# Patient Record
Sex: MALE | Race: OTHER | NOT HISPANIC OR LATINO
[De-identification: names, ages, dates, MRNs, and addresses within clinical notes are randomized per-mention and may not be internally consistent; named-entity substitution may affect disease eponyms.]

---

## 2022-09-09 ENCOUNTER — NON-APPOINTMENT (OUTPATIENT)
Age: 47
End: 2022-09-09

## 2023-11-09 ENCOUNTER — INPATIENT (INPATIENT)
Facility: HOSPITAL | Age: 48
LOS: 2 days | Discharge: HOME | DRG: 842 | End: 2023-11-12
Attending: STUDENT IN AN ORGANIZED HEALTH CARE EDUCATION/TRAINING PROGRAM | Admitting: STUDENT IN AN ORGANIZED HEALTH CARE EDUCATION/TRAINING PROGRAM
Payer: MEDICAID

## 2023-11-09 VITALS
HEART RATE: 75 BPM | WEIGHT: 190.04 LBS | OXYGEN SATURATION: 99 % | SYSTOLIC BLOOD PRESSURE: 192 MMHG | DIASTOLIC BLOOD PRESSURE: 113 MMHG | HEIGHT: 75 IN | RESPIRATION RATE: 17 BRPM | TEMPERATURE: 98 F

## 2023-11-09 DIAGNOSIS — D75.1 SECONDARY POLYCYTHEMIA: ICD-10-CM

## 2023-11-09 LAB
ALBUMIN SERPL ELPH-MCNC: 4 G/DL — SIGNIFICANT CHANGE UP (ref 3.5–5)
ALBUMIN SERPL ELPH-MCNC: 4 G/DL — SIGNIFICANT CHANGE UP (ref 3.5–5)
ALBUMIN SERPL ELPH-MCNC: 4.1 G/DL — SIGNIFICANT CHANGE UP (ref 3.5–5)
ALBUMIN SERPL ELPH-MCNC: 4.1 G/DL — SIGNIFICANT CHANGE UP (ref 3.5–5)
ALP SERPL-CCNC: 87 U/L — SIGNIFICANT CHANGE UP (ref 40–120)
ALP SERPL-CCNC: 87 U/L — SIGNIFICANT CHANGE UP (ref 40–120)
ALP SERPL-CCNC: 90 U/L — SIGNIFICANT CHANGE UP (ref 40–120)
ALP SERPL-CCNC: 90 U/L — SIGNIFICANT CHANGE UP (ref 40–120)
ALT FLD-CCNC: 73 U/L DA — HIGH (ref 10–60)
ALT FLD-CCNC: 73 U/L DA — HIGH (ref 10–60)
ALT FLD-CCNC: 77 U/L DA — HIGH (ref 10–60)
ALT FLD-CCNC: 77 U/L DA — HIGH (ref 10–60)
ANION GAP SERPL CALC-SCNC: 2 MMOL/L — LOW (ref 5–17)
ANION GAP SERPL CALC-SCNC: 2 MMOL/L — LOW (ref 5–17)
ANION GAP SERPL CALC-SCNC: 3 MMOL/L — LOW (ref 5–17)
ANION GAP SERPL CALC-SCNC: 3 MMOL/L — LOW (ref 5–17)
APPEARANCE UR: CLEAR — SIGNIFICANT CHANGE UP
APPEARANCE UR: CLEAR — SIGNIFICANT CHANGE UP
AST SERPL-CCNC: 40 U/L — SIGNIFICANT CHANGE UP (ref 10–40)
AST SERPL-CCNC: 40 U/L — SIGNIFICANT CHANGE UP (ref 10–40)
AST SERPL-CCNC: 43 U/L — HIGH (ref 10–40)
AST SERPL-CCNC: 43 U/L — HIGH (ref 10–40)
BACTERIA # UR AUTO: ABNORMAL /HPF
BACTERIA # UR AUTO: ABNORMAL /HPF
BASOPHILS # BLD AUTO: 0.01 K/UL — SIGNIFICANT CHANGE UP (ref 0–0.2)
BASOPHILS # BLD AUTO: 0.01 K/UL — SIGNIFICANT CHANGE UP (ref 0–0.2)
BASOPHILS NFR BLD AUTO: 0.2 % — SIGNIFICANT CHANGE UP (ref 0–2)
BASOPHILS NFR BLD AUTO: 0.2 % — SIGNIFICANT CHANGE UP (ref 0–2)
BILIRUB SERPL-MCNC: 0.9 MG/DL — SIGNIFICANT CHANGE UP (ref 0.2–1.2)
BILIRUB SERPL-MCNC: 0.9 MG/DL — SIGNIFICANT CHANGE UP (ref 0.2–1.2)
BILIRUB SERPL-MCNC: 1.1 MG/DL — SIGNIFICANT CHANGE UP (ref 0.2–1.2)
BILIRUB SERPL-MCNC: 1.1 MG/DL — SIGNIFICANT CHANGE UP (ref 0.2–1.2)
BILIRUB UR-MCNC: NEGATIVE — SIGNIFICANT CHANGE UP
BILIRUB UR-MCNC: NEGATIVE — SIGNIFICANT CHANGE UP
BUN SERPL-MCNC: 10 MG/DL — SIGNIFICANT CHANGE UP (ref 7–18)
BUN SERPL-MCNC: 10 MG/DL — SIGNIFICANT CHANGE UP (ref 7–18)
BUN SERPL-MCNC: 9 MG/DL — SIGNIFICANT CHANGE UP (ref 7–18)
BUN SERPL-MCNC: 9 MG/DL — SIGNIFICANT CHANGE UP (ref 7–18)
CALCIUM SERPL-MCNC: 8.4 MG/DL — SIGNIFICANT CHANGE UP (ref 8.4–10.5)
CALCIUM SERPL-MCNC: 8.4 MG/DL — SIGNIFICANT CHANGE UP (ref 8.4–10.5)
CALCIUM SERPL-MCNC: 9.1 MG/DL — SIGNIFICANT CHANGE UP (ref 8.4–10.5)
CALCIUM SERPL-MCNC: 9.1 MG/DL — SIGNIFICANT CHANGE UP (ref 8.4–10.5)
CHLORIDE SERPL-SCNC: 106 MMOL/L — SIGNIFICANT CHANGE UP (ref 96–108)
CHLORIDE SERPL-SCNC: 106 MMOL/L — SIGNIFICANT CHANGE UP (ref 96–108)
CHLORIDE SERPL-SCNC: 107 MMOL/L — SIGNIFICANT CHANGE UP (ref 96–108)
CHLORIDE SERPL-SCNC: 107 MMOL/L — SIGNIFICANT CHANGE UP (ref 96–108)
CO2 SERPL-SCNC: 28 MMOL/L — SIGNIFICANT CHANGE UP (ref 22–31)
COLOR SPEC: YELLOW — SIGNIFICANT CHANGE UP
COLOR SPEC: YELLOW — SIGNIFICANT CHANGE UP
CREAT SERPL-MCNC: 0.89 MG/DL — SIGNIFICANT CHANGE UP (ref 0.5–1.3)
CREAT SERPL-MCNC: 0.89 MG/DL — SIGNIFICANT CHANGE UP (ref 0.5–1.3)
CREAT SERPL-MCNC: 0.92 MG/DL — SIGNIFICANT CHANGE UP (ref 0.5–1.3)
CREAT SERPL-MCNC: 0.92 MG/DL — SIGNIFICANT CHANGE UP (ref 0.5–1.3)
DIFF PNL FLD: ABNORMAL
DIFF PNL FLD: ABNORMAL
EGFR: 101 ML/MIN/1.73M2 — SIGNIFICANT CHANGE UP
EGFR: 101 ML/MIN/1.73M2 — SIGNIFICANT CHANGE UP
EGFR: 104 ML/MIN/1.73M2 — SIGNIFICANT CHANGE UP
EGFR: 104 ML/MIN/1.73M2 — SIGNIFICANT CHANGE UP
EOSINOPHIL # BLD AUTO: 0 K/UL — SIGNIFICANT CHANGE UP (ref 0–0.5)
EOSINOPHIL # BLD AUTO: 0 K/UL — SIGNIFICANT CHANGE UP (ref 0–0.5)
EOSINOPHIL NFR BLD AUTO: 0 % — SIGNIFICANT CHANGE UP (ref 0–6)
EOSINOPHIL NFR BLD AUTO: 0 % — SIGNIFICANT CHANGE UP (ref 0–6)
EPI CELLS # UR: PRESENT
EPI CELLS # UR: PRESENT
GLUCOSE SERPL-MCNC: 128 MG/DL — HIGH (ref 70–99)
GLUCOSE SERPL-MCNC: 128 MG/DL — HIGH (ref 70–99)
GLUCOSE SERPL-MCNC: 143 MG/DL — HIGH (ref 70–99)
GLUCOSE SERPL-MCNC: 143 MG/DL — HIGH (ref 70–99)
GLUCOSE UR QL: NEGATIVE MG/DL — SIGNIFICANT CHANGE UP
GLUCOSE UR QL: NEGATIVE MG/DL — SIGNIFICANT CHANGE UP
HCT VFR BLD CALC: 56.2 % — HIGH (ref 39–50)
HCT VFR BLD CALC: 56.2 % — HIGH (ref 39–50)
HCT VFR BLD CALC: 56.4 % — HIGH (ref 39–50)
HCT VFR BLD CALC: 56.4 % — HIGH (ref 39–50)
HGB BLD-MCNC: 19.3 G/DL — CRITICAL HIGH (ref 13–17)
HGB BLD-MCNC: 19.3 G/DL — CRITICAL HIGH (ref 13–17)
HGB BLD-MCNC: 19.6 G/DL — CRITICAL HIGH (ref 13–17)
HGB BLD-MCNC: 19.6 G/DL — CRITICAL HIGH (ref 13–17)
IMM GRANULOCYTES NFR BLD AUTO: 0.5 % — SIGNIFICANT CHANGE UP (ref 0–0.9)
IMM GRANULOCYTES NFR BLD AUTO: 0.5 % — SIGNIFICANT CHANGE UP (ref 0–0.9)
KETONES UR-MCNC: NEGATIVE MG/DL — SIGNIFICANT CHANGE UP
KETONES UR-MCNC: NEGATIVE MG/DL — SIGNIFICANT CHANGE UP
LEUKOCYTE ESTERASE UR-ACNC: NEGATIVE — SIGNIFICANT CHANGE UP
LEUKOCYTE ESTERASE UR-ACNC: NEGATIVE — SIGNIFICANT CHANGE UP
LYMPHOCYTES # BLD AUTO: 1.32 K/UL — SIGNIFICANT CHANGE UP (ref 1–3.3)
LYMPHOCYTES # BLD AUTO: 1.32 K/UL — SIGNIFICANT CHANGE UP (ref 1–3.3)
LYMPHOCYTES # BLD AUTO: 30.8 % — SIGNIFICANT CHANGE UP (ref 13–44)
LYMPHOCYTES # BLD AUTO: 30.8 % — SIGNIFICANT CHANGE UP (ref 13–44)
MCHC RBC-ENTMCNC: 33.6 PG — SIGNIFICANT CHANGE UP (ref 27–34)
MCHC RBC-ENTMCNC: 33.6 PG — SIGNIFICANT CHANGE UP (ref 27–34)
MCHC RBC-ENTMCNC: 33.9 PG — SIGNIFICANT CHANGE UP (ref 27–34)
MCHC RBC-ENTMCNC: 33.9 PG — SIGNIFICANT CHANGE UP (ref 27–34)
MCHC RBC-ENTMCNC: 34.2 GM/DL — SIGNIFICANT CHANGE UP (ref 32–36)
MCHC RBC-ENTMCNC: 34.2 GM/DL — SIGNIFICANT CHANGE UP (ref 32–36)
MCHC RBC-ENTMCNC: 34.9 GM/DL — SIGNIFICANT CHANGE UP (ref 32–36)
MCHC RBC-ENTMCNC: 34.9 GM/DL — SIGNIFICANT CHANGE UP (ref 32–36)
MCV RBC AUTO: 97.2 FL — SIGNIFICANT CHANGE UP (ref 80–100)
MCV RBC AUTO: 97.2 FL — SIGNIFICANT CHANGE UP (ref 80–100)
MCV RBC AUTO: 98.3 FL — SIGNIFICANT CHANGE UP (ref 80–100)
MCV RBC AUTO: 98.3 FL — SIGNIFICANT CHANGE UP (ref 80–100)
MONOCYTES # BLD AUTO: 0.43 K/UL — SIGNIFICANT CHANGE UP (ref 0–0.9)
MONOCYTES # BLD AUTO: 0.43 K/UL — SIGNIFICANT CHANGE UP (ref 0–0.9)
MONOCYTES NFR BLD AUTO: 10 % — SIGNIFICANT CHANGE UP (ref 2–14)
MONOCYTES NFR BLD AUTO: 10 % — SIGNIFICANT CHANGE UP (ref 2–14)
NEUTROPHILS # BLD AUTO: 2.5 K/UL — SIGNIFICANT CHANGE UP (ref 1.8–7.4)
NEUTROPHILS # BLD AUTO: 2.5 K/UL — SIGNIFICANT CHANGE UP (ref 1.8–7.4)
NEUTROPHILS NFR BLD AUTO: 58.5 % — SIGNIFICANT CHANGE UP (ref 43–77)
NEUTROPHILS NFR BLD AUTO: 58.5 % — SIGNIFICANT CHANGE UP (ref 43–77)
NITRITE UR-MCNC: NEGATIVE — SIGNIFICANT CHANGE UP
NITRITE UR-MCNC: NEGATIVE — SIGNIFICANT CHANGE UP
NRBC # BLD: 0 /100 WBCS — SIGNIFICANT CHANGE UP (ref 0–0)
NT-PROBNP SERPL-SCNC: 21 PG/ML — SIGNIFICANT CHANGE UP (ref 0–125)
NT-PROBNP SERPL-SCNC: 21 PG/ML — SIGNIFICANT CHANGE UP (ref 0–125)
PH UR: 8 — SIGNIFICANT CHANGE UP (ref 5–8)
PH UR: 8 — SIGNIFICANT CHANGE UP (ref 5–8)
PLATELET # BLD AUTO: 107 K/UL — LOW (ref 150–400)
PLATELET # BLD AUTO: 107 K/UL — LOW (ref 150–400)
PLATELET # BLD AUTO: 109 K/UL — LOW (ref 150–400)
PLATELET # BLD AUTO: 109 K/UL — LOW (ref 150–400)
POTASSIUM SERPL-MCNC: 3.9 MMOL/L — SIGNIFICANT CHANGE UP (ref 3.5–5.3)
POTASSIUM SERPL-MCNC: 3.9 MMOL/L — SIGNIFICANT CHANGE UP (ref 3.5–5.3)
POTASSIUM SERPL-MCNC: 4.1 MMOL/L — SIGNIFICANT CHANGE UP (ref 3.5–5.3)
POTASSIUM SERPL-MCNC: 4.1 MMOL/L — SIGNIFICANT CHANGE UP (ref 3.5–5.3)
POTASSIUM SERPL-SCNC: 3.9 MMOL/L — SIGNIFICANT CHANGE UP (ref 3.5–5.3)
POTASSIUM SERPL-SCNC: 3.9 MMOL/L — SIGNIFICANT CHANGE UP (ref 3.5–5.3)
POTASSIUM SERPL-SCNC: 4.1 MMOL/L — SIGNIFICANT CHANGE UP (ref 3.5–5.3)
POTASSIUM SERPL-SCNC: 4.1 MMOL/L — SIGNIFICANT CHANGE UP (ref 3.5–5.3)
PROT SERPL-MCNC: 7.4 G/DL — SIGNIFICANT CHANGE UP (ref 6–8.3)
PROT SERPL-MCNC: 7.4 G/DL — SIGNIFICANT CHANGE UP (ref 6–8.3)
PROT SERPL-MCNC: 7.6 G/DL — SIGNIFICANT CHANGE UP (ref 6–8.3)
PROT SERPL-MCNC: 7.6 G/DL — SIGNIFICANT CHANGE UP (ref 6–8.3)
PROT UR-MCNC: NEGATIVE MG/DL — SIGNIFICANT CHANGE UP
PROT UR-MCNC: NEGATIVE MG/DL — SIGNIFICANT CHANGE UP
RBC # BLD: 5.74 M/UL — SIGNIFICANT CHANGE UP (ref 4.2–5.8)
RBC # BLD: 5.74 M/UL — SIGNIFICANT CHANGE UP (ref 4.2–5.8)
RBC # BLD: 5.78 M/UL — SIGNIFICANT CHANGE UP (ref 4.2–5.8)
RBC # BLD: 5.78 M/UL — SIGNIFICANT CHANGE UP (ref 4.2–5.8)
RBC # FLD: 15.9 % — HIGH (ref 10.3–14.5)
RBC CASTS # UR COMP ASSIST: 10 /HPF — HIGH (ref 0–4)
RBC CASTS # UR COMP ASSIST: 10 /HPF — HIGH (ref 0–4)
SODIUM SERPL-SCNC: 137 MMOL/L — SIGNIFICANT CHANGE UP (ref 135–145)
SP GR SPEC: 1.01 — SIGNIFICANT CHANGE UP (ref 1–1.03)
SP GR SPEC: 1.01 — SIGNIFICANT CHANGE UP (ref 1–1.03)
TROPONIN I, HIGH SENSITIVITY RESULT: 17.6 NG/L — SIGNIFICANT CHANGE UP
TROPONIN I, HIGH SENSITIVITY RESULT: 17.6 NG/L — SIGNIFICANT CHANGE UP
UROBILINOGEN FLD QL: 1 MG/DL — SIGNIFICANT CHANGE UP (ref 0.2–1)
UROBILINOGEN FLD QL: 1 MG/DL — SIGNIFICANT CHANGE UP (ref 0.2–1)
WBC # BLD: 4.28 K/UL — SIGNIFICANT CHANGE UP (ref 3.8–10.5)
WBC # BLD: 4.28 K/UL — SIGNIFICANT CHANGE UP (ref 3.8–10.5)
WBC # BLD: 4.71 K/UL — SIGNIFICANT CHANGE UP (ref 3.8–10.5)
WBC # BLD: 4.71 K/UL — SIGNIFICANT CHANGE UP (ref 3.8–10.5)
WBC # FLD AUTO: 4.28 K/UL — SIGNIFICANT CHANGE UP (ref 3.8–10.5)
WBC # FLD AUTO: 4.28 K/UL — SIGNIFICANT CHANGE UP (ref 3.8–10.5)
WBC # FLD AUTO: 4.71 K/UL — SIGNIFICANT CHANGE UP (ref 3.8–10.5)
WBC # FLD AUTO: 4.71 K/UL — SIGNIFICANT CHANGE UP (ref 3.8–10.5)
WBC UR QL: 1 /HPF — SIGNIFICANT CHANGE UP (ref 0–5)
WBC UR QL: 1 /HPF — SIGNIFICANT CHANGE UP (ref 0–5)

## 2023-11-09 PROCEDURE — 93010 ELECTROCARDIOGRAM REPORT: CPT

## 2023-11-09 PROCEDURE — 99285 EMERGENCY DEPT VISIT HI MDM: CPT

## 2023-11-09 PROCEDURE — 71046 X-RAY EXAM CHEST 2 VIEWS: CPT | Mod: 26

## 2023-11-09 PROCEDURE — 99222 1ST HOSP IP/OBS MODERATE 55: CPT

## 2023-11-09 PROCEDURE — 70450 CT HEAD/BRAIN W/O DYE: CPT | Mod: 26

## 2023-11-09 RX ORDER — SODIUM CHLORIDE 9 MG/ML
1000 INJECTION INTRAMUSCULAR; INTRAVENOUS; SUBCUTANEOUS ONCE
Refills: 0 | Status: COMPLETED | OUTPATIENT
Start: 2023-11-09 | End: 2023-11-09

## 2023-11-09 RX ADMIN — SODIUM CHLORIDE 1000 MILLILITER(S): 9 INJECTION INTRAMUSCULAR; INTRAVENOUS; SUBCUTANEOUS at 18:08

## 2023-11-09 NOTE — ED ADULT NURSE NOTE - NSFALLUNIVINTERV_ED_ALL_ED
Detail Level: Detailed Bed/Stretcher in lowest position, wheels locked, appropriate side rails in place/Call bell, personal items and telephone in reach/Instruct patient to call for assistance before getting out of bed/chair/stretcher/Non-slip footwear applied when patient is off stretcher/[place] to call system/Physically safe environment - no spills, clutter or unnecessary equipment/Purposeful proactive rounding/Room/bathroom lighting operational, light cord in reach

## 2023-11-09 NOTE — H&P ADULT - ATTENDING COMMENTS
Vital Signs Last 24 Hrs  T(C): 36.8 (09 Nov 2023 23:36), Max: 37.1 (09 Nov 2023 19:02)  T(F): 98.2 (09 Nov 2023 23:36), Max: 98.7 (09 Nov 2023 19:02)  HR: 73 (09 Nov 2023 23:37) (67 - 75)  BP: 181/103 (09 Nov 2023 23:37) (168/87 - 192/113)  BP(mean): 129 (09 Nov 2023 23:37) (127 - 129)  RR: 18 (09 Nov 2023 23:36) (17 - 18)  SpO2: 97% (09 Nov 2023 23:36) (97% - 99%)  Parameters below as of 09 Nov 2023 23:36  Patient On (Oxygen Delivery Method): room air    - Labs   hgb 19.3  HCT- 56.4      Glucose - 128  ALT - 73    CT head - unremarkable     Impression  48 year old man with no known medical hx here on account of transient dizziness, headache and incidental finding of very high BP on his friend's  home BP machine. In the ED, he was noted to have high hgb level as well.  He is admitted for further evaluation.    A/P   - Hypertensive urgency  - Uncontrolled elevated BP  - Undiagnosed HTN - primary vs secondary ( to polycythemia).  - Polycythemia with thrombocytopenia   - primary vs secondary  Primary - check EPO levels, JAK2   Secondary  - No evidence of pulmonary or CVS  disease;   will get ECHO,   renal U/S with doppler to r/o KERLINE,  check serum  renin, aldosterone, urine metanephrine    Plan   - Admit to Medicine   - Gradual BP control   -  Check EPO levels, JAK2   Secondary  ECHO, renal U/S with doppler to r/o KERLINE,  check serum  renin, aldosterone, urine metanephrine  - Hematology consult  - Monitor closely Vital Signs Last 24 Hrs  T(C): 36.8 (09 Nov 2023 23:36), Max: 37.1 (09 Nov 2023 19:02)  T(F): 98.2 (09 Nov 2023 23:36), Max: 98.7 (09 Nov 2023 19:02)  HR: 73 (09 Nov 2023 23:37) (67 - 75)  BP: 181/103 (09 Nov 2023 23:37) (168/87 - 192/113)  BP(mean): 129 (09 Nov 2023 23:37) (127 - 129)  RR: 18 (09 Nov 2023 23:36) (17 - 18)  SpO2: 97% (09 Nov 2023 23:36) (97% - 99%)  Parameters below as of 09 Nov 2023 23:36  Patient On (Oxygen Delivery Method): room air    - Labs   hgb 19.3  HCT- 56.4      Glucose - 128  ALT - 73    CT head - unremarkable     Impression  48 year old man with no known medical hx due to non follow up in the medical center here on account of transient dizziness, headache and incidental finding of very high BP on outpatient  BP machine. In the ED, he was noted to have high hgb level as well.  He is admitted for further evaluation.    A/P   - Hypertensive urgency  - Uncontrolled elevated BP  - Undiagnosed HTN - primary vs secondary ( to polycythemia).  - Polycythemia with thrombocytopenia   - primary vs secondary  Primary - check EPO levels, JAK2   Secondary  - No evidence of pulmonary or CVS  disease;   will get ECHO,   renal U/S with doppler to r/o KERLINE,  check serum  renin, aldosterone, urine metanephrine    Plan   - Admit to Medicine   - Gradual BP control   -  Check EPO levels, JAK2   Secondary  ECHO, renal U/S with doppler to r/o KERLINE,  check serum  renin, aldosterone, urine metanephrine  - Hematology consult  - Monitor closely

## 2023-11-09 NOTE — ED PROVIDER NOTE - CLINICAL SUMMARY MEDICAL DECISION MAKING FREE TEXT BOX
48-year-old male with asymptomatic hypertension.  Currently with normal neuro exam.  Plan to check labs, EKG and reassess

## 2023-11-09 NOTE — H&P ADULT - NSHPREVIEWOFSYSTEMS_GEN_ALL_CORE
CONSTITUTIONAL: No fever, weight loss, or fatigue  RESPIRATORY: No cough, wheezing, chills or hemoptysis; No shortness of breath  CARDIOVASCULAR: No chest pain, palpitations, dizziness, or leg swelling  GASTROINTESTINAL: No abdominal pain. No nausea, vomiting, or hematemesis; No diarrhea or constipation. No melena or hematochezia.  GENITOURINARY: No dysuria or hematuria, urinary frequency  NEUROLOGICAL: (+) headache and dizziness. No memory loss, loss of strength, numbness, or tremors  ENDOCRINE: No polyuria, polydipsia, or heat/cold intolerance  MUSKULOSKELETAL: No muscle aches, joint pains  HEME: no easy bruisability, no tender or enlarged lymph nodes  SKIN: No itching, burning, rashes, or lesions .

## 2023-11-09 NOTE — ED PROVIDER NOTE - CPE EDP EYES NORM
Controlled Substance Refill Request  Medication Name:   Requested Prescriptions     Pending Prescriptions Disp Refills     ALPRAZolam (XANAX) 0.5 MG tablet [Pharmacy Med Name: ALPRAZolam 0.5 MG Oral Tablet] 30 tablet 0     Sig: TAKE 1 TABLET BY MOUTH ONCE DAILY AS NEEDED FOR ANXIETY     ALPRAZolam (XANAX XR) 1 MG 24 hr tablet [Pharmacy Med Name: ALPRAZolam ER 1 MG Oral Tablet Extended Release 24 Hour] 60 tablet 0     Sig: TAKE 1 TABLET BY MOUTH AT NOON AND AT BEDTIME     Date Last Fill: 4/27/20  Requested Pharmacy: [facility]  Submit electronically to pharmacy  Controlled Substance Agreement on file:   Encounter-Level CSA Scan Date:    There are no encounter-level csa scan date.        Last office visit:  5/21/20      
normal...

## 2023-11-09 NOTE — ED ADULT TRIAGE NOTE - CHIEF COMPLAINT QUOTE
Message   Recorded as Task   Date: 06/06/2017 12:15 PM, Created By: Michelle Melendez   Task Name: 4. Patient Message   Assigned To: KOREY LÓPEZ   Regarding Patient: RANI HUSTON, Status: In Progress   Comment:    Michelle Melendez - 06 Jun 2017 12:15 PM     TASK CREATED  Minal with [facility] called with PT results.    Patient is taking 3mg daily. PT 34.6, INR 2.9    Patient is within protocol parameters and within personal INR goals of 2-3. Advised Minal to have patient continue 3mg daily and repeat PT in 1 week per protocol.   Michelle Melendez - 06 Jun 2017 12:15 PM     TASK IN PROGRESS   Michelle Melendez - 06 Jun 2017 3:06 PM     TASK EDITED  Anticoag note to follow/kige   Michelle Melendez - 06 Jun 2017 3:07 PM     TASK EDITED  Patient is aware/kige        Signatures   Electronically signed by : Michelle Melendez R.N.; Jun 6 2017  3:08PM CST     elevated blood pressure for 2 days. denies any chest pain or dizziness.

## 2023-11-09 NOTE — ED ADULT NURSE NOTE - OBJECTIVE STATEMENT
Pt A&Ox4, respirations even and unlabored, skin warm and dry. Pt with elevated blood pressure x2 days. Pt endorses intermittent dizziness. Pt denies chest pain, SOB and fatigue. Pt has no further complaints at this time, no distress noted. PIV inserted, labs drawn and sent.

## 2023-11-09 NOTE — H&P ADULT - HISTORY OF PRESENT ILLNESS
48M with no PMH presenting with headache and dizziness. Pt states that 2 days ago, he had an argument with somebody and immediately had a generalized headache and dizziness that lasted for 10 minutes. He checked his blood pressure at pharmacy next door which was SBP in 160's, then 180's for the past 2 days. The patient has been asymptomatic since then. He hasn't seen a PCP in 6 years. He currently denies fevers, chills, CP, SOB, N/V/D, abdominal pain, dysuria, numbness/tingling/weakness in extremities.

## 2023-11-09 NOTE — ED PROVIDER NOTE - OBJECTIVE STATEMENT
48-year-old male no past medical history presenting with elevated blood pressure found 3 days ago.  Patient states that he was angry and his friend checked his blood pressure and SBP was in 200s.  Patient reports feeling headache and dizziness at that time which slowly resolved after few minutes.  Patient had his blood pressure checked again yesterday and found it to be high at 164/90.  Patient came in today to get further evaluation.  States he did not check his blood pressure today.  States he has had no symptoms since 3 days ago.  Denies any headache, dizziness, chest pain, shortness of breath, leg swelling or visual changes.  No history of hypertension.

## 2023-11-09 NOTE — ED PROVIDER NOTE - PROGRESS NOTE DETAILS
Discussed abnormal hemoglobin result.  Patient agreed to recheck blood work after IV fluids.  Discussed possibility of patient having polycythemia which would need follow-up.

## 2023-11-09 NOTE — H&P ADULT - PROBLEM SELECTOR PLAN 2
presenting with /87 on admission  primary vs secondary?  f/u renal US  f/u renin, aldosterone, and metanephrine

## 2023-11-10 DIAGNOSIS — D75.1 SECONDARY POLYCYTHEMIA: ICD-10-CM

## 2023-11-10 DIAGNOSIS — Z29.9 ENCOUNTER FOR PROPHYLACTIC MEASURES, UNSPECIFIED: ICD-10-CM

## 2023-11-10 DIAGNOSIS — I10 ESSENTIAL (PRIMARY) HYPERTENSION: ICD-10-CM

## 2023-11-10 LAB
ANION GAP SERPL CALC-SCNC: 7 MMOL/L — SIGNIFICANT CHANGE UP (ref 5–17)
ANION GAP SERPL CALC-SCNC: 7 MMOL/L — SIGNIFICANT CHANGE UP (ref 5–17)
BUN SERPL-MCNC: 10 MG/DL — SIGNIFICANT CHANGE UP (ref 7–18)
BUN SERPL-MCNC: 10 MG/DL — SIGNIFICANT CHANGE UP (ref 7–18)
CALCIUM SERPL-MCNC: 8.4 MG/DL — SIGNIFICANT CHANGE UP (ref 8.4–10.5)
CALCIUM SERPL-MCNC: 8.4 MG/DL — SIGNIFICANT CHANGE UP (ref 8.4–10.5)
CHLORIDE SERPL-SCNC: 105 MMOL/L — SIGNIFICANT CHANGE UP (ref 96–108)
CHLORIDE SERPL-SCNC: 105 MMOL/L — SIGNIFICANT CHANGE UP (ref 96–108)
CO2 SERPL-SCNC: 25 MMOL/L — SIGNIFICANT CHANGE UP (ref 22–31)
CO2 SERPL-SCNC: 25 MMOL/L — SIGNIFICANT CHANGE UP (ref 22–31)
CREAT SERPL-MCNC: 0.91 MG/DL — SIGNIFICANT CHANGE UP (ref 0.5–1.3)
CREAT SERPL-MCNC: 0.91 MG/DL — SIGNIFICANT CHANGE UP (ref 0.5–1.3)
EGFR: 103 ML/MIN/1.73M2 — SIGNIFICANT CHANGE UP
EGFR: 103 ML/MIN/1.73M2 — SIGNIFICANT CHANGE UP
GLUCOSE SERPL-MCNC: 110 MG/DL — HIGH (ref 70–99)
GLUCOSE SERPL-MCNC: 110 MG/DL — HIGH (ref 70–99)
HCT VFR BLD CALC: 54.9 % — HIGH (ref 39–50)
HCT VFR BLD CALC: 54.9 % — HIGH (ref 39–50)
HGB BLD-MCNC: 19.2 G/DL — CRITICAL HIGH (ref 13–17)
HGB BLD-MCNC: 19.2 G/DL — CRITICAL HIGH (ref 13–17)
MAGNESIUM SERPL-MCNC: 1.9 MG/DL — SIGNIFICANT CHANGE UP (ref 1.6–2.6)
MAGNESIUM SERPL-MCNC: 1.9 MG/DL — SIGNIFICANT CHANGE UP (ref 1.6–2.6)
MCHC RBC-ENTMCNC: 34.1 PG — HIGH (ref 27–34)
MCHC RBC-ENTMCNC: 34.1 PG — HIGH (ref 27–34)
MCHC RBC-ENTMCNC: 35 GM/DL — SIGNIFICANT CHANGE UP (ref 32–36)
MCHC RBC-ENTMCNC: 35 GM/DL — SIGNIFICANT CHANGE UP (ref 32–36)
MCV RBC AUTO: 97.5 FL — SIGNIFICANT CHANGE UP (ref 80–100)
MCV RBC AUTO: 97.5 FL — SIGNIFICANT CHANGE UP (ref 80–100)
NRBC # BLD: 0 /100 WBCS — SIGNIFICANT CHANGE UP (ref 0–0)
NRBC # BLD: 0 /100 WBCS — SIGNIFICANT CHANGE UP (ref 0–0)
PHOSPHATE SERPL-MCNC: 2.5 MG/DL — SIGNIFICANT CHANGE UP (ref 2.5–4.5)
PHOSPHATE SERPL-MCNC: 2.5 MG/DL — SIGNIFICANT CHANGE UP (ref 2.5–4.5)
PLATELET # BLD AUTO: 104 K/UL — LOW (ref 150–400)
PLATELET # BLD AUTO: 104 K/UL — LOW (ref 150–400)
POTASSIUM SERPL-MCNC: 3.6 MMOL/L — SIGNIFICANT CHANGE UP (ref 3.5–5.3)
POTASSIUM SERPL-MCNC: 3.6 MMOL/L — SIGNIFICANT CHANGE UP (ref 3.5–5.3)
POTASSIUM SERPL-SCNC: 3.6 MMOL/L — SIGNIFICANT CHANGE UP (ref 3.5–5.3)
POTASSIUM SERPL-SCNC: 3.6 MMOL/L — SIGNIFICANT CHANGE UP (ref 3.5–5.3)
RBC # BLD: 5.63 M/UL — SIGNIFICANT CHANGE UP (ref 4.2–5.8)
RBC # BLD: 5.63 M/UL — SIGNIFICANT CHANGE UP (ref 4.2–5.8)
RBC # FLD: 15.3 % — HIGH (ref 10.3–14.5)
RBC # FLD: 15.3 % — HIGH (ref 10.3–14.5)
SODIUM SERPL-SCNC: 137 MMOL/L — SIGNIFICANT CHANGE UP (ref 135–145)
SODIUM SERPL-SCNC: 137 MMOL/L — SIGNIFICANT CHANGE UP (ref 135–145)
WBC # BLD: 5.49 K/UL — SIGNIFICANT CHANGE UP (ref 3.8–10.5)
WBC # BLD: 5.49 K/UL — SIGNIFICANT CHANGE UP (ref 3.8–10.5)
WBC # FLD AUTO: 5.49 K/UL — SIGNIFICANT CHANGE UP (ref 3.8–10.5)
WBC # FLD AUTO: 5.49 K/UL — SIGNIFICANT CHANGE UP (ref 3.8–10.5)

## 2023-11-10 PROCEDURE — 76775 US EXAM ABDO BACK WALL LIM: CPT | Mod: 26

## 2023-11-10 PROCEDURE — G0452: CPT | Mod: 26

## 2023-11-10 PROCEDURE — 99233 SBSQ HOSP IP/OBS HIGH 50: CPT

## 2023-11-10 RX ORDER — HYDRALAZINE HCL 50 MG
5 TABLET ORAL ONCE
Refills: 0 | Status: COMPLETED | OUTPATIENT
Start: 2023-11-10 | End: 2023-11-10

## 2023-11-10 RX ORDER — ONDANSETRON 8 MG/1
4 TABLET, FILM COATED ORAL EVERY 8 HOURS
Refills: 0 | Status: DISCONTINUED | OUTPATIENT
Start: 2023-11-10 | End: 2023-11-12

## 2023-11-10 RX ORDER — ENOXAPARIN SODIUM 100 MG/ML
40 INJECTION SUBCUTANEOUS EVERY 24 HOURS
Refills: 0 | Status: DISCONTINUED | OUTPATIENT
Start: 2023-11-10 | End: 2023-11-12

## 2023-11-10 RX ORDER — LANOLIN ALCOHOL/MO/W.PET/CERES
3 CREAM (GRAM) TOPICAL AT BEDTIME
Refills: 0 | Status: DISCONTINUED | OUTPATIENT
Start: 2023-11-10 | End: 2023-11-12

## 2023-11-10 RX ORDER — INFLUENZA VIRUS VACCINE 15; 15; 15; 15 UG/.5ML; UG/.5ML; UG/.5ML; UG/.5ML
0.5 SUSPENSION INTRAMUSCULAR ONCE
Refills: 0 | Status: DISCONTINUED | OUTPATIENT
Start: 2023-11-10 | End: 2023-11-12

## 2023-11-10 RX ORDER — LOSARTAN POTASSIUM 100 MG/1
25 TABLET, FILM COATED ORAL DAILY
Refills: 0 | Status: DISCONTINUED | OUTPATIENT
Start: 2023-11-10 | End: 2023-11-12

## 2023-11-10 RX ORDER — ACETAMINOPHEN 500 MG
650 TABLET ORAL EVERY 6 HOURS
Refills: 0 | Status: DISCONTINUED | OUTPATIENT
Start: 2023-11-10 | End: 2023-11-12

## 2023-11-10 RX ADMIN — ENOXAPARIN SODIUM 40 MILLIGRAM(S): 100 INJECTION SUBCUTANEOUS at 06:33

## 2023-11-10 RX ADMIN — LOSARTAN POTASSIUM 25 MILLIGRAM(S): 100 TABLET, FILM COATED ORAL at 06:33

## 2023-11-10 RX ADMIN — Medication 5 MILLIGRAM(S): at 07:51

## 2023-11-10 NOTE — PATIENT PROFILE ADULT - FALL HARM RISK - UNIVERSAL INTERVENTIONS
Bed in lowest position, wheels locked, appropriate side rails in place/Call bell, personal items and telephone in reach/Instruct patient to call for assistance before getting out of bed or chair/Non-slip footwear when patient is out of bed/[place] to call system/Physically safe environment - no spills, clutter or unnecessary equipment/Purposeful Proactive Rounding/Room/bathroom lighting operational, light cord in reach

## 2023-11-10 NOTE — PROGRESS NOTE ADULT - NS ATTEND AMEND GEN_ALL_CORE FT
48M no known medical hx but does not see a doctor, p/w cc dizziness, HA, found with HTN and polycythemia, admission for workup.    AP:  HTN urgency  Polycythemia   thrombocytopenia    -consult hematology  -f/u recs, likely needs asa, possible phlebotomy therapeutic  -started losartan 25mg po qd with good response  -send EPO and JAK2  -trend CBC  -dvt ppx    Ordered labs: CBC, BMP  Reviewed labs: CBC, BMP  Discussed management with: specialists as described above                          19.2   5.49  )-----------( 104      ( 10 Nov 2023 05:50 )             54.9       11-10    137  |  105  |  10  ----------------------------<  110<H>  3.6   |  25  |  0.91    Ca    8.4      10 Nov 2023 05:50  Phos  2.5     11-10  Mg     1.9     11-10    TPro  7.4  /  Alb  4.0  /  TBili  0.9  /  DBili  x   /  AST  40  /  ALT  73<H>  /  AlkPhos  90  11-09

## 2023-11-10 NOTE — CONSULT NOTE ADULT - ASSESSMENT
48y male with recent insomania and feeling dizziness ED visit for persistent new HTN and was found to have elevated H/H. 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 malignancy   will followup

## 2023-11-10 NOTE — CONSULT NOTE ADULT - SUBJECTIVE AND OBJECTIVE BOX
48y male with no significant PMH and no physician care for 6 years presented to ED for feeling dizziness and high BP. He was found to have Hb 19 and admit for erythrocytosis rule out P.vera. pt c/o insomnia but  denied sob/chest/thrombosis/CVA. He also denied h/o hypoxia/signs of sleeping apnea. He is a active smoker and ETOH abuser.     PAST MEDICAL & SURGICAL HISTORY:  Allergies    No Known Allergies    Intolerances    FAMILY HISTORY:  Social History:  smoking/ETOH  Vital Signs Last 24 Hrs  T(C): 36.6 (10 Nov 2023 20:36), Max: 36.8 (09 Nov 2023 23:36)  T(F): 97.9 (10 Nov 2023 20:36), Max: 98.2 (09 Nov 2023 23:36)  HR: 61 (10 Nov 2023 20:36) (61 - 73)  BP: 162/104 (10 Nov 2023 20:36) (149/90 - 188/96)  BP(mean): 90 (10 Nov 2023 13:35) (90 - 129)  RR: 18 (10 Nov 2023 20:36) (18 - 19)  SpO2: 99% (10 Nov 2023 20:36) (96% - 100%)    Parameters below as of 10 Nov 2023 20:36  Patient On (Oxygen Delivery Method): room air    general AA0x3 NAD   HEENT conjunctiva erythema  lung CTA   abd soft   extrem no edema   neurology nonfocal                         19.2   5.49  )-----------( 104      ( 10 Nov 2023 05:50 )             54.9   11-10    137  |  105  |  10  ----------------------------<  110<H>  3.6   |  25  |  0.91    Ca    8.4      10 Nov 2023 05:50  Phos  2.5     11-10  Mg     1.9     11-10    TPro  7.4  /  Alb  4.0  /  TBili  0.9  /  DBili  x   /  AST  40  /  ALT  73<H>  /  AlkPhos  90  11-09  renal sono. kidney stone     MEDICATIONS  (STANDING):  enoxaparin Injectable 40 milliGRAM(s) SubCutaneous every 24 hours  influenza   Vaccine 0.5 milliLiter(s) IntraMuscular once  losartan 25 milliGRAM(s) Oral daily    MEDICATIONS  (PRN):  acetaminophen     Tablet .. 650 milliGRAM(s) Oral every 6 hours PRN Temp greater or equal to 38C (100.4F), Mild Pain (1 - 3)  aluminum hydroxide/magnesium hydroxide/simethicone Suspension 30 milliLiter(s) Oral every 4 hours PRN Dyspepsia  melatonin 3 milliGRAM(s) Oral at bedtime PRN Insomnia  ondansetron Injectable 4 milliGRAM(s) IV Push every 8 hours PRN Nausea and/or Vomiting

## 2023-11-10 NOTE — PATIENT PROFILE ADULT - ARRIVAL FROM
----- Message from Chula Marinelli MD sent at 1/9/2020  1:04 PM CST -----  Lets decrease her Bumex to 1 mg daily.   ----- Message -----  From: DOREEN Ulloa, RN  Sent: 1/8/2020  11:11 AM CST  To: Chula Marinelli MD    Labs you requested, after restarting diuretic. She has f/u appt w/you 1/10.     Home

## 2023-11-11 ENCOUNTER — TRANSCRIPTION ENCOUNTER (OUTPATIENT)
Age: 48
End: 2023-11-11

## 2023-11-11 LAB
EPO SERPL-MCNC: 10 MIU/ML — SIGNIFICANT CHANGE UP (ref 2.6–18.5)
EPO SERPL-MCNC: 10 MIU/ML — SIGNIFICANT CHANGE UP (ref 2.6–18.5)

## 2023-11-11 PROCEDURE — 99233 SBSQ HOSP IP/OBS HIGH 50: CPT

## 2023-11-11 PROCEDURE — ZZZZZ: CPT

## 2023-11-11 RX ORDER — HYDROXYUREA 500 MG/1
500 CAPSULE ORAL
Refills: 0 | Status: DISCONTINUED | OUTPATIENT
Start: 2023-11-11 | End: 2023-11-12

## 2023-11-11 RX ORDER — ASPIRIN/CALCIUM CARB/MAGNESIUM 324 MG
81 TABLET ORAL DAILY
Refills: 0 | Status: DISCONTINUED | OUTPATIENT
Start: 2023-11-11 | End: 2023-11-12

## 2023-11-11 RX ADMIN — Medication 81 MILLIGRAM(S): at 12:39

## 2023-11-11 RX ADMIN — ENOXAPARIN SODIUM 40 MILLIGRAM(S): 100 INJECTION SUBCUTANEOUS at 05:36

## 2023-11-11 RX ADMIN — HYDROXYUREA 500 MILLIGRAM(S): 500 CAPSULE ORAL at 18:43

## 2023-11-11 RX ADMIN — LOSARTAN POTASSIUM 25 MILLIGRAM(S): 100 TABLET, FILM COATED ORAL at 05:35

## 2023-11-11 NOTE — PROGRESS NOTE ADULT - ASSESSMENT
48y male with recent insomania and feeling dizziness ED visit for persistent new HTN and was found to have elevated H/H. 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 malignancy   No objection for d/c if primary team cleared pt  will followup with patient for continue treatment and workup at [facility] if d/c'd   d/w patient about his diagnosis, treatment and need for continue treatment

## 2023-11-11 NOTE — DISCHARGE NOTE PROVIDER - HOSPITAL COURSE
===INCOMPLETE===    48M with no PMH presenting with headache and dizziness. Pt states that 2 days ago, he had an argument with somebody and immediately had a generalized headache and dizziness that lasted for 10 minutes. He checked his blood pressure at pharmacy next door which was SBP in 160's, then 180's for the past 2 days. The patient has been asymptomatic since then. He hasn't seen a PCP in 6 years. He currently denies fevers, chills, CP, SOB, N/V/D, abdominal pain, dysuria, numbness/tingling/weakness in extremities.     In the ED: Temp 98.1, HR 75, RR 17, /113, Pulseox 99% on RA. 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     48M with no PMH presenting with headache and dizziness. Pt states that 2 days ago, he had an argument with somebody and immediately had a generalized headache and dizziness that lasted for 10 minutes. He checked his blood pressure at pharmacy next door which was SBP in 160's, then 180's for the past 2 days. The patient has been asymptomatic since then. He hasn't seen a PCP in 6 years. He currently denies fevers, chills, CP, SOB, N/V/D, abdominal pain, dysuria, numbness/tingling/weakness in extremities.     In the ED: Temp 98.1, HR 75, RR 17, /113, Pulseox 99% on RA. 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    #HTN  Started on losartan 25mg qday with good response  CT head neg  Renal US neg    Please note this is only a summary, refer to the full chart for further details.

## 2023-11-11 NOTE — DISCHARGE NOTE PROVIDER - NSDCMRMEDTOKEN_GEN_ALL_CORE_FT
aspirin 81 mg oral delayed release tablet: 1 tab(s) orally once a day  hydroxyurea 500 mg oral capsule: 1 cap(s) orally 2 times a day  losartan 25 mg oral tablet: 1 tab(s) orally once a day

## 2023-11-11 NOTE — PROCEDURE NOTE - ADDITIONAL PROCEDURE DETAILS
Asked by Hematology to perform therapeutic phlebotomy given high elevated Hgb/hct.  Discussed procedure with patient and he is agreeable.    1 attempt at Left AC vein.  Drained approx 450cc (1 bag) of blood with phlebotomy kit.  Pt tolerated procedure well and no complications.  Advised bedrest for 15 mins.  Advised when getting out of bed to do so slowly at first.  Encouraged oral hydration.  Dr. Perez Medicine Attending notified.    Jacek Horowitz NP

## 2023-11-11 NOTE — DISCHARGE NOTE PROVIDER - ATTENDING DISCHARGE PHYSICAL EXAMINATION:
Gen: NAD  Neuro: alert, answering qs appropriately, moves all extremities  HEENT: anicteric, moist oral mucosa  Neck: supple  Cards: no murmurs appreicated  Pulm: good inspiratory effort, breathing comfortably  Abd: soft, NT/ND, BS+  Ext: no edema  Skin: warm, dry    #symptomatic polycythemia  #HTN urgency

## 2023-11-11 NOTE — DISCHARGE NOTE PROVIDER - CARE PROVIDER_API CALL
Haja, [facility]  Medical Oncology  [address], [address]  [place] [address]  Phone: [phone]  Fax: [phone]  Follow Up Time: 2 weeks

## 2023-11-11 NOTE — PROGRESS NOTE ADULT - ASSESSMENT
48M no known medical hx but does not see a doctor, p/w cc dizziness, HA, found with HTN and polycythemia, admission for workup.    AP:  HTN urgency  Polycythemia   thrombocytopenia    -appreciate heme onc recs  -phlebotomize 500cc today  -started on asa and hydroxyurea, continue  -started losartan 25mg po qd with good response, can uptitrate  -send EPO and JAK2  -trend CBC  -dvt ppx    Ordered labs: CBC, BMP  Reviewed labs: CBC, BMP  Discussed management with: specialists as described above                          19.2   5.49  )-----------( 104      ( 10 Nov 2023 05:50 )             54.9       11-10    137  |  105  |  10  ----------------------------<  110<H>  3.6   |  25  |  0.91    Ca    8.4      10 Nov 2023 05:50  Phos  2.5     11-10  Mg     1.9     11-10    TPro  7.4  /  Alb  4.0  /  TBili  0.9  /  DBili  x   /  AST  40  /  ALT  73<H>  /  AlkPhos  90  11-09.   48M no known medical hx but does not see a doctor, p/w cc dizziness, HA, found with HTN and polycythemia, admission for workup.    AP:  HTN urgency  Polycythemia   thrombocytopenia    -appreciate heme onc recs  -phlebotomize 500cc today  -started on asa and hydroxyurea, continue  -obtain CTAP with contrast  -started losartan 25mg po qd with good response, can uptitrate  -send EPO and JAK2  -trend CBC  -dvt ppx    Ordered labs: CBC, BMP  Reviewed labs: CBC, BMP  Discussed management with: specialists as described above                          19.2   5.49  )-----------( 104      ( 10 Nov 2023 05:50 )             54.9       11-10    137  |  105  |  10  ----------------------------<  110<H>  3.6   |  25  |  0.91    Ca    8.4      10 Nov 2023 05:50  Phos  2.5     11-10  Mg     1.9     11-10    TPro  7.4  /  Alb  4.0  /  TBili  0.9  /  DBili  x   /  AST  40  /  ALT  73<H>  /  AlkPhos  90  11-09.

## 2023-11-11 NOTE — DISCHARGE NOTE PROVIDER - NSDCCPCAREPLAN_GEN_ALL_CORE_FT
PRINCIPAL DISCHARGE DIAGNOSIS  Diagnosis: Polycythemia  Assessment and Plan of Treatment: Your blood tests when you arrived showed you have a high hemoglobin and hematocrit.      SECONDARY DISCHARGE DIAGNOSES  Diagnosis: Uncontrolled hypertension  Assessment and Plan of Treatment:      PRINCIPAL DISCHARGE DIAGNOSIS  Diagnosis: Polycythemia  Assessment and Plan of Treatment: You were found to have a high blood cell count. You were diagnosed with polycythemia and had therapeutic phlebotomy to remove the extra blood from your body. Polycythemia is a condition that causes your bone marrow to produce too many red blood cells. The extra blood cells make your blood thicker than normal. Blood that is too thick cannot flow easily, so less oxygen is delivered to your body's tissues.   Call 911 for any of the following:  You have any of the following signs of a heart attack:  Squeezing, pressure, or pain in your chest  You may also have any of the following:  Discomfort or pain in your back, neck, jaw, stomach, or arm  Shortness of breath  Nausea or vomiting  Lightheadedness or a sudden cold sweat  You have any of the following signs of a stroke:  Numbness or drooping on one side of your face  Weakness in an arm or leg  Confusion or difficulty speaking  Dizziness, a severe headache, or vision loss  You have a severe headache or a seizure.  You cough up blood.  Medicines:  Aspirin can help thin your blood, and relive bone pain and burning in your hands or feet. Aspirin can cause stomach bleeding in some people. Only take aspirin if directed by your healthcare provider. Do not take more than the recommended amount.  Do not take iron supplements.  Take your medicine as directed.  Do not smoke: Nicotine and other chemicals in cigarettes and cigars can increase your risk for blood clots. Exercise helps improve blood flow and prevents blood clots. Drink liquids as directed: Liquids help keep your blood thin.   Follow- up with your PCP on discharge.        SECONDARY DISCHARGE DIAGNOSES  Diagnosis: Uncontrolled hypertension  Assessment and Plan of Treatment: You have a history of high blood pressure. High blood pressure is a condition that puts you at risk for heart attack, stroke and kidney disease. Please continue to take your medications as prescribed. You can also help control your blood pressure by maintaining a healthy weight, eating a diet low in fat and rich in fruits and vegetables, reduce the amount of salt in your diet. Also, reduce alcohol and try to include some form of physical activity daily for at least 30 mins. Follow up with your medical doctor to establish long term blood pressure treatment goals.  Notify your doctor if you have any of the following symptoms:   Dizziness, Lightheadedness, Blurry vision, Headache, Chest pain, Shortness of breath       PRINCIPAL DISCHARGE DIAGNOSIS  Diagnosis: Polycythemia vera  Assessment and Plan of Treatment: You were found to have a high blood cell count. You were diagnosed with polycythemia and had therapeutic phlebotomy to remove the extra blood from your body. Polycythemia is a condition that causes your bone marrow to produce too many red blood cells. The extra blood cells make your blood thicker than normal. Blood that is too thick cannot flow easily, so less oxygen is delivered to your body's tissues.   Call 911 for any of the following:  You have any of the following signs of a heart attack:  Squeezing, pressure, or pain in your chest  You may also have any of the following:  Discomfort or pain in your back, neck, jaw, stomach, or arm  Shortness of breath  Nausea or vomiting  Lightheadedness or a sudden cold sweat  You have any of the following signs of a stroke:  Numbness or drooping on one side of your face  Weakness in an arm or leg  Confusion or difficulty speaking  Dizziness, a severe headache, or vision loss  You have a severe headache or a seizure.  You cough up blood.  Medicines:  Aspirin can help thin your blood, and relive bone pain and burning in your hands or feet. Aspirin can cause stomach bleeding in some people. Only take aspirin if directed by your healthcare provider. Do not take more than the recommended amount.  Do not take iron supplements.  Take your medicine as directed.  Do not smoke: Nicotine and other chemicals in cigarettes and cigars can increase your risk for blood clots. Exercise helps improve blood flow and prevents blood clots. Drink liquids as directed: Liquids help keep your blood thin.   Follow- up with Dr Smyth (information provided) within ONE WEEK of discharge.        SECONDARY DISCHARGE DIAGNOSES  Diagnosis: Uncontrolled hypertension  Assessment and Plan of Treatment: You have a history of high blood pressure. High blood pressure is a condition that puts you at risk for heart attack, stroke and kidney disease. Please continue to take your medications as prescribed. You can also help control your blood pressure by maintaining a healthy weight, eating a diet low in fat and rich in fruits and vegetables, reduce the amount of salt in your diet. Also, reduce alcohol and try to include some form of physical activity daily for at least 30 mins. Follow up with your medical doctor to establish long term blood pressure treatment goals.  Notify your doctor if you have any of the following symptoms:   Dizziness, Lightheadedness, Blurry vision, Headache, Chest pain, Shortness of breath      Diagnosis: Polycythemia  Assessment and Plan of Treatment: You were found to have a high blood cell count. You were diagnosed with polycythemia and had therapeutic phlebotomy to remove the extra blood from your body. Polycythemia is a condition that causes your bone marrow to produce too many red blood cells. The extra blood cells make your blood thicker than normal. Blood that is too thick cannot flow easily, so less oxygen is delivered to your body's tissues.   Call 911 for any of the following:  You have any of the following signs of a heart attack:  Squeezing, pressure, or pain in your chest  You may also have any of the following:  Discomfort or pain in your back, neck, jaw, stomach, or arm  Shortness of breath  Nausea or vomiting  Lightheadedness or a sudden cold sweat  You have any of the following signs of a stroke:  Numbness or drooping on one side of your face  Weakness in an arm or leg  Confusion or difficulty speaking  Dizziness, a severe headache, or vision loss  You have a severe headache or a seizure.  You cough up blood.  Medicines:  Aspirin can help thin your blood, and relive bone pain and burning in your hands or feet. Aspirin can cause stomach bleeding in some people. Only take aspirin if directed by your healthcare provider. Do not take more than the recommended amount.  Do not take iron supplements.  Take your medicine as directed.  Do not smoke: Nicotine and other chemicals in cigarettes and cigars can increase your risk for blood clots. Exercise helps improve blood flow and prevents blood clots. Drink liquids as directed: Liquids help keep your blood thin.   Follow- up with Dr Smyth (information provided) within ONE WEEK of discharge.      Diagnosis: Hypertensive urgency  Assessment and Plan of Treatment: You have a history of high blood pressure. High blood pressure is a condition that puts you at risk for heart attack, stroke and kidney disease. Please continue to take your medications as prescribed. You can also help control your blood pressure by maintaining a healthy weight, eating a diet low in fat and rich in fruits and vegetables, reduce the amount of salt in your diet. Also, reduce alcohol and try to include some form of physical activity daily for at least 30 mins. Follow up with your medical doctor to establish long term blood pressure treatment goals.  Notify your doctor if you have any of the following symptoms:   Dizziness, Lightheadedness, Blurry vision, Headache, Chest pain, Shortness of breath

## 2023-11-11 NOTE — PROGRESS NOTE ADULT - SUBJECTIVE AND OBJECTIVE BOX
Interval of present illness: No acute events overnight. Pt seen at bedside. No new complaints.    REVIEW OF SYSTEMS:    CONSTITUTIONAL: No fever  EYES: No acute visual disturbances  NECK: No pain or stiffness  RESPIRATORY: No cough; No shortness of breath  CARDIOVASCULAR: No chest pain, no palpitations  GASTROINTESTINAL: No pain. No nausea or vomiting.  No diarrhea   NEUROLOGICAL: No headache or numbness, no tremors  MUSCULOSKELETAL: no muscle pain  GENITOURINARY: No dysuria, no frequency, no hesitancy  PSYCHIATRY: No depression , no anxiety  ALL OTHER  ROS negative     O:  Vital Signs Last 24 Hrs  T(C): 36.9 (11 Nov 2023 05:29), Max: 36.9 (11 Nov 2023 05:29)  T(F): 98.5 (11 Nov 2023 05:29), Max: 98.5 (11 Nov 2023 05:29)  HR: 65 (11 Nov 2023 05:29) (61 - 71)  BP: 156/88 (11 Nov 2023 05:29) (148/97 - 167/98)  BP(mean): 90 (10 Nov 2023 13:35) (90 - 90)  RR: 18 (11 Nov 2023 05:29) (18 - 19)  SpO2: 98% (11 Nov 2023 05:29) (97% - 100%)    Parameters below as of 11 Nov 2023 05:29  Patient On (Oxygen Delivery Method): room air        Gen: NAD  Neuro: alert, answering qs appropriately, moves all extremities  HEENT: anicteric, moist oral mucosa  Neck: supple  Cards: no murmurs appreicated  Pulm: good inspiratory effort, breathing comfortably  Abd: soft, NT/ND, BS+  Ext: no edema  Skin: warm, dry      acetaminophen     Tablet .. 650 milliGRAM(s) Oral every 6 hours PRN  aluminum hydroxide/magnesium hydroxide/simethicone Suspension 30 milliLiter(s) Oral every 4 hours PRN  aspirin enteric coated 81 milliGRAM(s) Oral daily  enoxaparin Injectable 40 milliGRAM(s) SubCutaneous every 24 hours  hydroxyurea 500 milliGRAM(s) Oral two times a day  influenza   Vaccine 0.5 milliLiter(s) IntraMuscular once  losartan 25 milliGRAM(s) Oral daily  melatonin 3 milliGRAM(s) Oral at bedtime PRN  ondansetron Injectable 4 milliGRAM(s) IV Push every 8 hours PRN                            19.2   5.49  )-----------( 104      ( 10 Nov 2023 05:50 )             54.9       11-10    137  |  105  |  10  ----------------------------<  110<H>  3.6   |  25  |  0.91    Ca    8.4      10 Nov 2023 05:50  Phos  2.5     11-10  Mg     1.9     11-10    TPro  7.4  /  Alb  4.0  /  TBili  0.9  /  DBili  x   /  AST  40  /  ALT  73<H>  /  AlkPhos  90  11-09  
pt seen and examined. feels better with BP better controlled   Vital Signs Last 24 Hrs  T(C): 36.9 (11 Nov 2023 14:21), Max: 36.9 (11 Nov 2023 05:29)  T(F): 98.4 (11 Nov 2023 14:21), Max: 98.5 (11 Nov 2023 05:29)  HR: 68 (11 Nov 2023 14:21) (61 - 68)  BP: 131/85 (11 Nov 2023 14:21) (131/85 - 162/104)  BP(mean): --  RR: 18 (11 Nov 2023 14:21) (18 - 18)  SpO2: 98% (11 Nov 2023 14:21) (98% - 99%)    Parameters below as of 11 Nov 2023 14:21  Patient On (Oxygen Delivery Method): room air    general AAox3 NAD   HEENT conjunctiva erythema   lung CTA b/l   extrem no edema   neurology nonfocal   CBC Full  -  ( 10 Nov 2023 05:50 )  WBC Count : 5.49 K/uL  RBC Count : 5.63 M/uL  Hemoglobin : 19.2 g/dL  Hematocrit : 54.9 %  Platelet Count - Automated : 104 K/uL  Mean Cell Volume : 97.5 fl  Mean Cell Hemoglobin : 34.1 pg  Mean Cell Hemoglobin Concentration : 35.0 gm/dL  Auto Neutrophil # : x  Auto Lymphocyte # : x  Auto Monocyte # : x  Auto Eosinophil # : x  Auto Basophil # : x  Auto Neutrophil % : x  Auto Lymphocyte % : x  Auto Monocyte % : x  Auto Eosinophil % : x  Auto Basophil % : x  MEDICATIONS  (STANDING):  aspirin enteric coated 81 milliGRAM(s) Oral daily  enoxaparin Injectable 40 milliGRAM(s) SubCutaneous every 24 hours  hydroxyurea 500 milliGRAM(s) Oral two times a day  influenza   Vaccine 0.5 milliLiter(s) IntraMuscular once  losartan 25 milliGRAM(s) Oral daily    MEDICATIONS  (PRN):  acetaminophen     Tablet .. 650 milliGRAM(s) Oral every 6 hours PRN Temp greater or equal to 38C (100.4F), Mild Pain (1 - 3)  aluminum hydroxide/magnesium hydroxide/simethicone Suspension 30 milliLiter(s) Oral every 4 hours PRN Dyspepsia  melatonin 3 milliGRAM(s) Oral at bedtime PRN Insomnia  ondansetron Injectable 4 milliGRAM(s) IV Push every 8 hours PRN Nausea and/or Vomiting  
ED Hold Daily Note.    No acute events.  "I feel normal."  Last saw a Physician 6 years ago in [facility], [place], states he was told bloodwork and checkup were fine.  Checked BP at pharmacy a few months ago and it was on higher side of normal, but never this high when he checked it the other day.  No CP, SOB .    Allergies    No Known Allergies    Intolerances      Vital Signs Last 24 Hrs  T(C): 36.7 (10 Nov 2023 13:35), Max: 37.1 (09 Nov 2023 19:02)  T(F): 98.1 (10 Nov 2023 13:35), Max: 98.7 (09 Nov 2023 19:02)  HR: 71 (10 Nov 2023 13:35) (62 - 75)  BP: 149/90 (10 Nov 2023 13:35) (149/90 - 192/113)  BP(mean): 90 (10 Nov 2023 13:35) (90 - 129)  RR: 19 (10 Nov 2023 13:35) (17 - 19)  SpO2: 97% (10 Nov 2023 13:35) (96% - 100%)    Parameters below as of 10 Nov 2023 13:35  Patient On (Oxygen Delivery Method): room air        MedsMEDICATIONS  (STANDING):  enoxaparin Injectable 40 milliGRAM(s) SubCutaneous every 24 hours  losartan 25 milliGRAM(s) Oral daily    MEDICATIONS  (PRN):  acetaminophen     Tablet .. 650 milliGRAM(s) Oral every 6 hours PRN Temp greater or equal to 38C (100.4F), Mild Pain (1 - 3)  aluminum hydroxide/magnesium hydroxide/simethicone Suspension 30 milliLiter(s) Oral every 4 hours PRN Dyspepsia  melatonin 3 milliGRAM(s) Oral at bedtime PRN Insomnia  ondansetron Injectable 4 milliGRAM(s) IV Push every 8 hours PRN Nausea and/or Vomiting      PHYSICAL EXAM:  GENERAL: NAD, well-groomed, well-developed  NEURO: AAOx3, DANIEL b/l, 5/5 b/l UE and 5/5 b/l LE motor strength  LUNG: Lungs clear to auscultation bilaterally, no wheezing, rhonchi, or rales.  HEART: S1, S2, no S3 or S4. Regular rate and rhythm. No murmurs, gallops, or rubs. No JVD  ABDOMEN: Bowel sounds present, abd Soft, Nontender, Nondistended  EXTREMITIES:  2+ Peripheral Pulses b/l, No clubbing, cyanosis, or edema  SKIN: No rashes or lesions    Consultant(s) Notes Reviewed:  [x ] YES  [ ] NO  Care Discussed with Consultants/Other Providers [ x] YES  [ ] NO    LABS:                        19.2   5.49  )-----------( 104      ( 10 Nov 2023 05:50 )             54.9     11-10    137  |  105  |  10  ----------------------------<  110<H>  3.6   |  25  |  0.91    Ca    8.4      10 Nov 2023 05:50  Phos  2.5     11-10  Mg     1.9     11-10    TPro  7.4  /  Alb  4.0  /  TBili  0.9  /  DBili  x   /  AST  40  /  ALT  73<H>  /  AlkPhos  90  11-09        RADIOLOGY & ADDITIONAL TESTS:    1) Polycythemia: Consulted [facility] heme/onc. Jak2, erythropoietin drawn and pending  2) HTN: Renal US unremarkable, started on losartan. Will need outpatient follow-up.  3) Dispo planning pending heme/onc. Most of work-up will be outpatient.

## 2023-11-12 ENCOUNTER — TRANSCRIPTION ENCOUNTER (OUTPATIENT)
Age: 48
End: 2023-11-12

## 2023-11-12 VITALS
TEMPERATURE: 98 F | DIASTOLIC BLOOD PRESSURE: 85 MMHG | OXYGEN SATURATION: 95 % | SYSTOLIC BLOOD PRESSURE: 129 MMHG | RESPIRATION RATE: 17 BRPM | HEART RATE: 73 BPM

## 2023-11-12 LAB
ANION GAP SERPL CALC-SCNC: 8 MMOL/L — SIGNIFICANT CHANGE UP (ref 5–17)
ANION GAP SERPL CALC-SCNC: 8 MMOL/L — SIGNIFICANT CHANGE UP (ref 5–17)
BUN SERPL-MCNC: 19 MG/DL — HIGH (ref 7–18)
BUN SERPL-MCNC: 19 MG/DL — HIGH (ref 7–18)
CALCIUM SERPL-MCNC: 8.7 MG/DL — SIGNIFICANT CHANGE UP (ref 8.4–10.5)
CALCIUM SERPL-MCNC: 8.7 MG/DL — SIGNIFICANT CHANGE UP (ref 8.4–10.5)
CHLORIDE SERPL-SCNC: 102 MMOL/L — SIGNIFICANT CHANGE UP (ref 96–108)
CHLORIDE SERPL-SCNC: 102 MMOL/L — SIGNIFICANT CHANGE UP (ref 96–108)
CO2 SERPL-SCNC: 23 MMOL/L — SIGNIFICANT CHANGE UP (ref 22–31)
CO2 SERPL-SCNC: 23 MMOL/L — SIGNIFICANT CHANGE UP (ref 22–31)
CREAT SERPL-MCNC: 0.99 MG/DL — SIGNIFICANT CHANGE UP (ref 0.5–1.3)
CREAT SERPL-MCNC: 0.99 MG/DL — SIGNIFICANT CHANGE UP (ref 0.5–1.3)
EGFR: 93 ML/MIN/1.73M2 — SIGNIFICANT CHANGE UP
EGFR: 93 ML/MIN/1.73M2 — SIGNIFICANT CHANGE UP
GLUCOSE SERPL-MCNC: 138 MG/DL — HIGH (ref 70–99)
GLUCOSE SERPL-MCNC: 138 MG/DL — HIGH (ref 70–99)
HCT VFR BLD CALC: 54.4 % — HIGH (ref 39–50)
HCT VFR BLD CALC: 54.4 % — HIGH (ref 39–50)
HGB BLD-MCNC: 19.2 G/DL — CRITICAL HIGH (ref 13–17)
HGB BLD-MCNC: 19.2 G/DL — CRITICAL HIGH (ref 13–17)
MCHC RBC-ENTMCNC: 34.2 PG — HIGH (ref 27–34)
MCHC RBC-ENTMCNC: 34.2 PG — HIGH (ref 27–34)
MCHC RBC-ENTMCNC: 35.3 GM/DL — SIGNIFICANT CHANGE UP (ref 32–36)
MCHC RBC-ENTMCNC: 35.3 GM/DL — SIGNIFICANT CHANGE UP (ref 32–36)
MCV RBC AUTO: 96.8 FL — SIGNIFICANT CHANGE UP (ref 80–100)
MCV RBC AUTO: 96.8 FL — SIGNIFICANT CHANGE UP (ref 80–100)
NRBC # BLD: 0 /100 WBCS — SIGNIFICANT CHANGE UP (ref 0–0)
NRBC # BLD: 0 /100 WBCS — SIGNIFICANT CHANGE UP (ref 0–0)
PLATELET # BLD AUTO: 104 K/UL — LOW (ref 150–400)
PLATELET # BLD AUTO: 104 K/UL — LOW (ref 150–400)
POTASSIUM SERPL-MCNC: 3.8 MMOL/L — SIGNIFICANT CHANGE UP (ref 3.5–5.3)
POTASSIUM SERPL-MCNC: 3.8 MMOL/L — SIGNIFICANT CHANGE UP (ref 3.5–5.3)
POTASSIUM SERPL-SCNC: 3.8 MMOL/L — SIGNIFICANT CHANGE UP (ref 3.5–5.3)
POTASSIUM SERPL-SCNC: 3.8 MMOL/L — SIGNIFICANT CHANGE UP (ref 3.5–5.3)
RBC # BLD: 5.62 M/UL — SIGNIFICANT CHANGE UP (ref 4.2–5.8)
RBC # BLD: 5.62 M/UL — SIGNIFICANT CHANGE UP (ref 4.2–5.8)
RBC # FLD: 15.5 % — HIGH (ref 10.3–14.5)
RBC # FLD: 15.5 % — HIGH (ref 10.3–14.5)
SODIUM SERPL-SCNC: 133 MMOL/L — LOW (ref 135–145)
SODIUM SERPL-SCNC: 133 MMOL/L — LOW (ref 135–145)
WBC # BLD: 5.82 K/UL — SIGNIFICANT CHANGE UP (ref 3.8–10.5)
WBC # BLD: 5.82 K/UL — SIGNIFICANT CHANGE UP (ref 3.8–10.5)
WBC # FLD AUTO: 5.82 K/UL — SIGNIFICANT CHANGE UP (ref 3.8–10.5)
WBC # FLD AUTO: 5.82 K/UL — SIGNIFICANT CHANGE UP (ref 3.8–10.5)

## 2023-11-12 PROCEDURE — 80053 COMPREHEN METABOLIC PANEL: CPT

## 2023-11-12 PROCEDURE — 70450 CT HEAD/BRAIN W/O DYE: CPT | Mod: MA

## 2023-11-12 PROCEDURE — 82088 ASSAY OF ALDOSTERONE: CPT

## 2023-11-12 PROCEDURE — 83880 ASSAY OF NATRIURETIC PEPTIDE: CPT

## 2023-11-12 PROCEDURE — 36415 COLL VENOUS BLD VENIPUNCTURE: CPT

## 2023-11-12 PROCEDURE — 85025 COMPLETE CBC W/AUTO DIFF WBC: CPT

## 2023-11-12 PROCEDURE — 83735 ASSAY OF MAGNESIUM: CPT

## 2023-11-12 PROCEDURE — 84100 ASSAY OF PHOSPHORUS: CPT

## 2023-11-12 PROCEDURE — 71260 CT THORAX DX C+: CPT

## 2023-11-12 PROCEDURE — 81270 JAK2 GENE: CPT

## 2023-11-12 PROCEDURE — 84484 ASSAY OF TROPONIN QUANT: CPT

## 2023-11-12 PROCEDURE — 99285 EMERGENCY DEPT VISIT HI MDM: CPT

## 2023-11-12 PROCEDURE — 80048 BASIC METABOLIC PNL TOTAL CA: CPT

## 2023-11-12 PROCEDURE — 74177 CT ABD & PELVIS W/CONTRAST: CPT

## 2023-11-12 PROCEDURE — 85027 COMPLETE CBC AUTOMATED: CPT

## 2023-11-12 PROCEDURE — 81001 URINALYSIS AUTO W/SCOPE: CPT

## 2023-11-12 PROCEDURE — 76775 US EXAM ABDO BACK WALL LIM: CPT

## 2023-11-12 PROCEDURE — 71260 CT THORAX DX C+: CPT | Mod: 26

## 2023-11-12 PROCEDURE — 84244 ASSAY OF RENIN: CPT

## 2023-11-12 PROCEDURE — 82668 ASSAY OF ERYTHROPOIETIN: CPT

## 2023-11-12 PROCEDURE — 83835 ASSAY OF METANEPHRINES: CPT

## 2023-11-12 PROCEDURE — 93306 TTE W/DOPPLER COMPLETE: CPT

## 2023-11-12 PROCEDURE — 74177 CT ABD & PELVIS W/CONTRAST: CPT | Mod: 26

## 2023-11-12 PROCEDURE — 99239 HOSP IP/OBS DSCHRG MGMT >30: CPT

## 2023-11-12 PROCEDURE — 71046 X-RAY EXAM CHEST 2 VIEWS: CPT

## 2023-11-12 PROCEDURE — 93005 ELECTROCARDIOGRAM TRACING: CPT

## 2023-11-12 RX ORDER — HYDROXYUREA 500 MG/1
1 CAPSULE ORAL
Qty: 60 | Refills: 0
Start: 2023-11-12 | End: 2023-12-11

## 2023-11-12 RX ORDER — LOSARTAN POTASSIUM 100 MG/1
1 TABLET, FILM COATED ORAL
Qty: 30 | Refills: 0
Start: 2023-11-12 | End: 2023-12-11

## 2023-11-12 RX ORDER — ASPIRIN/CALCIUM CARB/MAGNESIUM 324 MG
1 TABLET ORAL
Qty: 30 | Refills: 0
Start: 2023-11-12 | End: 2023-12-11

## 2023-11-12 RX ADMIN — ENOXAPARIN SODIUM 40 MILLIGRAM(S): 100 INJECTION SUBCUTANEOUS at 06:00

## 2023-11-12 RX ADMIN — HYDROXYUREA 500 MILLIGRAM(S): 500 CAPSULE ORAL at 06:01

## 2023-11-12 RX ADMIN — LOSARTAN POTASSIUM 25 MILLIGRAM(S): 100 TABLET, FILM COATED ORAL at 06:01

## 2023-11-12 NOTE — DISCHARGE NOTE NURSING/CASE MANAGEMENT/SOCIAL WORK - NSDCPEFALRISK_GEN_ALL_CORE
For information on Fall & Injury Prevention, visit: [email or web address] OR  [email or web address] OR  https://www.cdc.gov/steadi/patient.html

## 2023-11-12 NOTE — DISCHARGE NOTE NURSING/CASE MANAGEMENT/SOCIAL WORK - PATIENT PORTAL LINK FT
You can access the FollowMyHealth Patient Portal offered by [facility] by registering at the following website: [email or web address]. By joining [facility]’s FollowMyHealth portal, you will also be able to view your health information using other applications (apps) compatible with our system.

## 2023-11-12 NOTE — CHART NOTE - NSCHARTNOTEFT_GEN_A_CORE
EVENT:    notified by RN of crital lab result hgb 19.2 and hct 54.4    HPI:  48M with no PMH presenting with headache and dizziness. Pt states that 2 days ago, he had an argument with somebody and immediately had a generalized headache and dizziness that lasted for 10 minutes. He checked his blood pressure at pharmacy next door which was SBP in 160's, then 180's for the past 2 days. The patient has been asymptomatic since then. He hasn't seen a PCP in 6 years. He currently denies fevers, chills, CP, SOB, N/V/D, abdominal pain, dysuria, numbness/tingling/weakness in extremities.  (09 Nov 2023 23:54)        OBJECTIVE:  Vital Signs Last 24 Hrs  T(C): 36.7 (12 Nov 2023 05:32), Max: 37.2 (11 Nov 2023 20:20)  T(F): 98.1 (12 Nov 2023 05:32), Max: 98.9 (11 Nov 2023 20:20)  HR: 64 (12 Nov 2023 05:32) (64 - 68)  BP: 134/86 (12 Nov 2023 05:32) (131/85 - 147/89)  BP(mean): --  RR: 18 (12 Nov 2023 05:32) (18 - 18)  SpO2: 97% (12 Nov 2023 05:32) (97% - 98%)    Parameters below as of 12 Nov 2023 05:32  Patient On (Oxygen Delivery Method): room air        LABS:                        19.2   5.82  )-----------( 104      ( 12 Nov 2023 10:23 )             54.4     11-12    133<L>  |  102  |  19<H>  ----------------------------<  138<H>  3.8   |  23  |  0.99    Ca    8.7      12 Nov 2023 10:23            PLAN:   1. discussed with Dr. Perez , pt to follow-up with Dr. Glass outpt. no further inpatient work-up.

## 2023-11-13 LAB
ALDOST SERPL-MCNC: <3 NG/DL — SIGNIFICANT CHANGE UP
ALDOST SERPL-MCNC: <3 NG/DL — SIGNIFICANT CHANGE UP

## 2023-11-15 ENCOUNTER — APPOINTMENT (OUTPATIENT)
Dept: CARE COORDINATION | Facility: HOME HEALTH | Age: 48
End: 2023-11-15
Payer: MEDICAID

## 2023-11-15 DIAGNOSIS — D75.1 SECONDARY POLYCYTHEMIA: ICD-10-CM

## 2023-11-15 DIAGNOSIS — I10 ESSENTIAL (PRIMARY) HYPERTENSION: ICD-10-CM

## 2023-11-15 PROBLEM — Z00.00 ENCOUNTER FOR PREVENTIVE HEALTH EXAMINATION: Status: ACTIVE | Noted: 2023-11-15

## 2023-11-15 PROCEDURE — 99442: CPT

## 2023-11-16 PROBLEM — I10 HTN (HYPERTENSION): Status: ACTIVE | Noted: 2023-11-16

## 2023-11-16 PROBLEM — D75.1 POLYCYTHEMIA: Status: ACTIVE | Noted: 2023-11-16

## 2023-11-16 LAB
RENIN PLAS-CCNC: 0.57 NG/ML/HR — SIGNIFICANT CHANGE UP (ref 0.17–5.38)
RENIN PLAS-CCNC: 0.57 NG/ML/HR — SIGNIFICANT CHANGE UP (ref 0.17–5.38)

## 2023-11-16 RX ORDER — ASPIRIN 81 MG/1
81 TABLET, COATED ORAL DAILY
Refills: 0 | Status: ACTIVE | COMMUNITY
Start: 2023-11-16

## 2023-11-16 RX ORDER — LOSARTAN POTASSIUM 25 MG/1
25 TABLET, FILM COATED ORAL
Qty: 1 | Refills: 1 | Status: ACTIVE | COMMUNITY
Start: 2023-11-16

## 2023-11-16 RX ORDER — HYDROXYUREA 500 MG/1
500 CAPSULE ORAL TWICE DAILY
Refills: 0 | Status: ACTIVE | COMMUNITY
Start: 2023-11-16

## 2023-11-17 LAB
JAK2 P.V617F BLD/T QL: SIGNIFICANT CHANGE UP
JAK2 P.V617F BLD/T QL: SIGNIFICANT CHANGE UP

## 2023-11-22 LAB
JAK2 EXON 13 MUT ANL BLD/T: SIGNIFICANT CHANGE UP
JAK2 EXON 13 MUT ANL BLD/T: SIGNIFICANT CHANGE UP
REFLEX:: SIGNIFICANT CHANGE UP
REFLEX:: SIGNIFICANT CHANGE UP

## 2023-11-23 LAB
METANEPH 24H UR-MRATE: SIGNIFICANT CHANGE UP UG/24 HR (ref 58–276)
METANEPH 24H UR-MRATE: SIGNIFICANT CHANGE UP UG/24 HR (ref 58–276)
METANEPHS 24H UR-MCNC: 154 UG/L — SIGNIFICANT CHANGE UP
METANEPHS 24H UR-MCNC: 154 UG/L — SIGNIFICANT CHANGE UP
NORMETANEPHRINE 24H UR-MRATE: SIGNIFICANT CHANGE UP UG/24 HR (ref 156–729)
NORMETANEPHRINE 24H UR-MRATE: SIGNIFICANT CHANGE UP UG/24 HR (ref 156–729)
NORMETANEPHRINE.: 259 UG/L — SIGNIFICANT CHANGE UP
NORMETANEPHRINE.: 259 UG/L — SIGNIFICANT CHANGE UP